# Patient Record
(demographics unavailable — no encounter records)

---

## 2025-04-02 NOTE — CONSULT LETTER
[Dear  ___] : Dear  [unfilled], [Consult Letter:] : I had the pleasure of evaluating your patient, [unfilled]. [Please see my note below.] : Please see my note below. [Consult Closing:] : Thank you very much for allowing me to participate in the care of this patient.  If you have any questions, please do not hesitate to contact me. [Sincerely,] : Sincerely, [FreeTextEntry3] : Agustina Lofton MD Pediatric Endocrinologist [facility]

## 2025-04-02 NOTE — PHYSICAL EXAM
[Obese] : obese [Acanthosis Nigricans___] : acanthosis nigricans over [unfilled] [Normal Appearance] : normal appearance [Well formed] : well formed [Normally Set] : normally set [WNL for age] : within normal limits of age [Goiter] : no goiter [None] : there were no thyroid nodules [Normal S1 and S2] : normal S1 and S2 [Murmur] : no murmurs [Clear to Ausculation Bilaterally] : clear to auscultation bilaterally [Abdomen Soft] : soft [Abdomen Tenderness] : non-tender [3] : was Adonay stage 3 [Moderate] : moderate [Adonay Stage ___] : the Adonay stage for breast development was [unfilled] [Normal] : normal

## 2025-04-02 NOTE — PAST MEDICAL HISTORY
[At Term] : at term [ Section] : by  section [None] : there were no delivery complications [Age Appropriate] : age appropriate developmental milestones met [FreeTextEntry1] : twin A

## 2025-04-02 NOTE — HISTORY OF PRESENT ILLNESS
[FreeTextEntry2] : Panchito is an 11 year 4-month-old female referred by Dr. Grigsby for evaluation of borderline HbA1C 6% discovered on her annual labs.   Review of the growth chart provided by pediatrician's office showed that Panchito has gained ~ 30 lbs in the past year. Growth data prior to age 10 not available at the time of the visit as family moved from [facility] one year ago.   Mother reports that Panchito prefers snacks to home cooked food.   Diet recall:  - breakfast: eggs and bread  - lunch: chavarria with bread  - dinner: skips  - snacks: chips, Takis, candies  - drinks: water or juice; soda ~once per week   - fast food (pizza, burger or gyro) once per week  Panchito does not exercise.   She denied fatigue, polyuria, polydipsia, nocturia, dry skin, hair loss, temperature intolerance.     [Premenarchal] : premenarchal

## 2025-04-02 NOTE — FAMILY HISTORY
[___ inches] : [unfilled] inches [de-identified] :  from [place] [FreeTextEntry1] :  from [place] [FreeTextEntry5] : 13-13 yo [FreeTextEntry2] : 11 yo sister (menarche at 10.6yo) and twin brother

## 2025-04-02 NOTE — REASON FOR VISIT
[Consultation] : a consultation visit [Patient] : patient [Mother] : mother [FreeTextEntry1] : borderline HbA1C 6%

## 2025-04-02 NOTE — ASSESSMENT
[FreeTextEntry1] : 11 year 4-month-old obese girl with acanthosis nigricans and hx elevated HbA1C 6% in the settings of family history of type 2 diabetes in maternal and paternal grandparents.   Discussed importance of healthy diet changes and active lifestyle to prevent diabetes and other complications of obesity, like hypercholesterolemia, hypertension, cardiac disease.  Recommended to schedule appointment with Nutritionist.  Fasting lab work to be done ASAP.  Will contact mother to discuss results.

## 2025-04-02 NOTE — DATA REVIEWED
[FreeTextEntry1] : 10/23/24 CBC WNL,  AST/ALT 24/27, HbA1C 6%, cholesterol 148, LDL Chol 80, HDL Chol 46(L), , TSH 2.30, free T4 1.22